# Patient Record
Sex: MALE | Race: WHITE | NOT HISPANIC OR LATINO | Employment: FULL TIME | URBAN - METROPOLITAN AREA
[De-identification: names, ages, dates, MRNs, and addresses within clinical notes are randomized per-mention and may not be internally consistent; named-entity substitution may affect disease eponyms.]

---

## 2020-09-08 ENCOUNTER — OFFICE VISIT (OUTPATIENT)
Dept: URGENT CARE | Facility: CLINIC | Age: 45
End: 2020-09-08
Payer: COMMERCIAL

## 2020-09-08 VITALS
HEART RATE: 70 BPM | RESPIRATION RATE: 18 BRPM | TEMPERATURE: 98.3 F | DIASTOLIC BLOOD PRESSURE: 88 MMHG | OXYGEN SATURATION: 98 % | SYSTOLIC BLOOD PRESSURE: 130 MMHG

## 2020-09-08 DIAGNOSIS — J01.10 ACUTE FRONTAL SINUSITIS, RECURRENCE NOT SPECIFIED: Primary | ICD-10-CM

## 2020-09-08 DIAGNOSIS — Z11.59 SCREENING FOR VIRAL DISEASE: ICD-10-CM

## 2020-09-08 PROCEDURE — U0003 INFECTIOUS AGENT DETECTION BY NUCLEIC ACID (DNA OR RNA); SEVERE ACUTE RESPIRATORY SYNDROME CORONAVIRUS 2 (SARS-COV-2) (CORONAVIRUS DISEASE [COVID-19]), AMPLIFIED PROBE TECHNIQUE, MAKING USE OF HIGH THROUGHPUT TECHNOLOGIES AS DESCRIBED BY CMS-2020-01-R: HCPCS | Performed by: PHYSICIAN ASSISTANT

## 2020-09-08 PROCEDURE — 99203 OFFICE O/P NEW LOW 30 MIN: CPT | Performed by: FAMILY MEDICINE

## 2020-09-08 RX ORDER — PAROXETINE HYDROCHLORIDE 20 MG/1
20 TABLET, FILM COATED ORAL DAILY
COMMUNITY
Start: 2020-06-24

## 2020-09-08 NOTE — PATIENT INSTRUCTIONS
Take antibiotic as instructed  May also do Mucinex D 12 hour to help with symptom relief  May continue Tylenol and or ibuprofen for headache  Rest   Fluids  COVID testing initiated  Results may take up to 5-10 days to return  If you have a [facility] my chart account, you may access test results through that account  If you do not have the [facility] my chart account, please establish one so you can access your test results  Home quarantine / self isolation strongly encouraged  If you would develop profound weakness, chest pain, shortness of breath proceed to emergency room for further evaluation otherwise recommend follow-up with your primary care provider in the next 5-7 days if you are not improving  [address]    Your healthcare provider and/or public health staff have evaluated you and have determined that you do not need to remain in the hospital at this time  At this time you can be isolated at home where you will be monitored by staff from your local or state health department  You should carefully follow the prevention and isolation steps below until a healthcare provider or local or state health department says that you can return to your normal activities  Stay home except to get medical care    People who are mildly ill with COVID-19 are able to isolate at home during their illness  You should restrict activities outside your home, except for getting medical care  Do not go to work, school, or public areas  Avoid using public transportation, ride-sharing, or taxis  Separate yourself from other people and animals in your home    People: As much as possible, you should stay in a specific room and away from other people in your home  Also, you should use a separate bathroom, if available  Animals: You should restrict contact with pets and other animals while you are sick with COVID-19, just like you would around other people   Although there have not been reports of pets or other animals becoming sick with COVID-19, it is still recommended that people sick with COVID-19 limit contact with animals until more information is known about the virus  When possible, have another member of your household care for your animals while you are sick  If you are sick with COVID-19, avoid contact with your pet, including petting, snuggling, being kissed or licked, and sharing food  If you must care for your pet or be around animals while you are sick, wash your hands before and after you interact with pets and wear a facemask  See COVID-19 and Animals for more information  Call ahead before visiting your doctor    If you have a medical appointment, call the healthcare provider and tell them that you have or may have COVID-19  This will help the healthcare providers office take steps to keep other people from getting infected or exposed  Wear a facemask    You should wear a facemask when you are around other people (e g , sharing a room or vehicle) or pets and before you enter a healthcare providers office  If you are not able to wear a facemask (for example, because it causes trouble breathing), then people who live with you should not stay in the same room with you, or they should wear a facemask if they enter your room  Cover your coughs and sneezes    Cover your mouth and nose with a tissue when you cough or sneeze  Throw used tissues in a lined trash can  Immediately wash your hands with soap and water for at least 20 seconds or, if soap and water are not available, clean your hands with an alcohol-based hand  that contains at least 60% alcohol  Clean your hands often    Wash your hands often with soap and water for at least 20 seconds, especially after blowing your nose, coughing, or sneezing; going to the bathroom; and before eating or preparing food   If soap and water are not readily available, use an alcohol-based hand  with at least 60% alcohol, covering all surfaces of your hands and rubbing them together until they feel dry  Soap and water are the best option if hands are visibly dirty  Avoid touching your eyes, nose, and mouth with unwashed hands  Avoid sharing personal household items    You should not share dishes, drinking glasses, cups, eating utensils, towels, or bedding with other people or pets in your home  After using these items, they should be washed thoroughly with soap and water  Clean all high-touch surfaces everyday    High touch surfaces include counters, tabletops, doorknobs, bathroom fixtures, toilets, phones, keyboards, tablets, and bedside tables  Also, clean any surfaces that may have blood, stool, or body fluids on them  Use a household cleaning spray or wipe, according to the label instructions  Labels contain instructions for safe and effective use of the cleaning product including precautions you should take when applying the product, such as wearing gloves and making sure you have good ventilation during use of the product  Monitor your symptoms    Seek prompt medical attention if your illness is worsening (e g , difficulty breathing)  Before seeking care, call your healthcare provider and tell them that you have, or are being evaluated for, COVID-19  Put on a facemask before you enter the facility  These steps will help the healthcare providers office to keep other people in the office or waiting room from getting infected or exposed  Ask your healthcare provider to call the local or [facility] health department  Persons who are placed under active monitoring or facilitated self-monitoring should follow instructions provided by their local health department or occupational health professionals, as appropriate  If you have a medical emergency and need to call 911, notify the dispatch personnel that you have, or are being evaluated for COVID-19   If possible, put on a facemask before emergency medical services arrive  Discontinuing home isolation    Patients with confirmed COVID-19 should remain under home isolation precautions until the following conditions are met:   - They have had no fever for at least 24 hours (that is one full day of no fever without the use medicine that reduces fevers)  AND  - other symptoms have improved (for example, when their cough or shortness of breath have improved)  AND  - at least 10 days have passed since their symptoms first appeared  Patients with confirmed COVID-19 should also notify close contacts (including their workplace) and ask that they self-quarantine  Currently, close contact is defined as being within 6 feet for 10 minutes or more from the period 48 hours before symptom onset to the time at which the patient went into isolation  Close contacts of patients diagnosed with COVID-19 should be instructed by the patient to self-quarantine for 14 days from the last time of their last contact with the patient       Source: [employer]

## 2020-09-09 LAB — SARS-COV-2 RNA SPEC QL NAA+PROBE: NOT DETECTED

## 2025-01-13 NOTE — PROGRESS NOTES
Patient daughter is calling stating patient is feeling weak and lightheaded and wants to speak with nurse please follow up with patient daughter   [facility]    NAME: Ronni Cheatham is a 54 y o  male  : 1975    MRN: [id number]  DATE: 2020  TIME: 12:56 PM    Assessment and Plan   Acute frontal sinusitis, recurrence not specified [J01 10]  1  Acute frontal sinusitis, recurrence not specified  Novel Coronavirus (COVID-19), PCR [facility] - Office Collection   2  Screening for viral disease  Novel Coronavirus (COVID-19), PCR [facility] - Office Collection       Patient Instructions   Patient Instructions   Take antibiotic as instructed  May also do Mucinex D 12 hour to help with symptom relief  May continue Tylenol and or ibuprofen for headache  Rest   Fluids  COVID testing initiated  Results may take up to 5-10 days to return  If you have a [facility] my chart account, you may access test results through that account  If you do not have the [employer]  [facility] my chart account, please establish one so you can access your test results  Home quarantine / self isolation strongly encouraged  If you would develop profound weakness, chest pain, shortness of breath proceed to emergency room for further evaluation otherwise recommend follow-up with your primary care provider in the next 5-7 days if you are not improving  [address]    Your healthcare provider and/or public health staff have evaluated you and have determined that you do not need to remain in the hospital at this time  At this time you can be isolated at home where you will be monitored by staff from your local or state health department  You should carefully follow the prevention and isolation steps below until a healthcare provider or local or state health department says that you can return to your normal activities  Stay home except to get medical care    People who are mildly ill with COVID-19 are able to isolate at home during their illness  You should restrict activities outside your home, except for getting medical care   Do not go to work, school, or public areas  Avoid using public transportation, ride-sharing, or taxis  Separate yourself from other people and animals in your home    People: As much as possible, you should stay in a specific room and away from other people in your home  Also, you should use a separate bathroom, if available  Animals: You should restrict contact with pets and other animals while you are sick with COVID-19, just like you would around other people  Although there have not been reports of pets or other animals becoming sick with COVID-19, it is still recommended that people sick with COVID-19 limit contact with animals until more information is known about the virus  When possible, have another member of your household care for your animals while you are sick  If you are sick with COVID-19, avoid contact with your pet, including petting, snuggling, being kissed or licked, and sharing food  If you must care for your pet or be around animals while you are sick, wash your hands before and after you interact with pets and wear a facemask  See COVID-19 and Animals for more information  Call ahead before visiting your doctor    If you have a medical appointment, call the healthcare provider and tell them that you have or may have COVID-19  This will help the healthcare providers office take steps to keep other people from getting infected or exposed  Wear a facemask    You should wear a facemask when you are around other people (e g , sharing a room or vehicle) or pets and before you enter a healthcare providers office  If you are not able to wear a facemask (for example, because it causes trouble breathing), then people who live with you should not stay in the same room with you, or they should wear a facemask if they enter your room  Cover your coughs and sneezes    Cover your mouth and nose with a tissue when you cough or sneeze  Throw used tissues in a lined trash can   Immediately wash your hands with soap and water for at least 20 seconds or, if soap and water are not available, clean your hands with an alcohol-based hand  that contains at least 60% alcohol  Clean your hands often    Wash your hands often with soap and water for at least 20 seconds, especially after blowing your nose, coughing, or sneezing; going to the bathroom; and before eating or preparing food  If soap and water are not readily available, use an alcohol-based hand  with at least 60% alcohol, covering all surfaces of your hands and rubbing them together until they feel dry  Soap and water are the best option if hands are visibly dirty  Avoid touching your eyes, nose, and mouth with unwashed hands  Avoid sharing personal household items    You should not share dishes, drinking glasses, cups, eating utensils, towels, or bedding with other people or pets in your home  After using these items, they should be washed thoroughly with soap and water  Clean all high-touch surfaces everyday    High touch surfaces include counters, tabletops, doorknobs, bathroom fixtures, toilets, phones, keyboards, tablets, and bedside tables  Also, clean any surfaces that may have blood, stool, or body fluids on them  Use a household cleaning spray or wipe, according to the label instructions  Labels contain instructions for safe and effective use of the cleaning product including precautions you should take when applying the product, such as wearing gloves and making sure you have good ventilation during use of the product  Monitor your symptoms    Seek prompt medical attention if your illness is worsening (e g , difficulty breathing)  Before seeking care, call your healthcare provider and tell them that you have, or are being evaluated for, COVID-19  Put on a facemask before you enter the facility  These steps will help the healthcare providers office to keep other people in the office or waiting room from getting infected or exposed  Ask your healthcare provider to call the local or [facility] health department  Persons who are placed under active monitoring or facilitated self-monitoring should follow instructions provided by their local health department or occupational health professionals, as appropriate  If you have a medical emergency and need to call 911, notify the dispatch personnel that you have, or are being evaluated for COVID-19  If possible, put on a facemask before emergency medical services arrive  Discontinuing home isolation    Patients with confirmed COVID-19 should remain under home isolation precautions until the following conditions are met:   - They have had no fever for at least 24 hours (that is one full day of no fever without the use medicine that reduces fevers)  AND  - other symptoms have improved (for example, when their cough or shortness of breath have improved)  AND  - at least 10 days have passed since their symptoms first appeared  Patients with confirmed COVID-19 should also notify close contacts (including their workplace) and ask that they self-quarantine  Currently, close contact is defined as being within 6 feet for 10 minutes or more from the period 48 hours before symptom onset to the time at which the patient went into isolation  Close contacts of patients diagnosed with COVID-19 should be instructed by the patient to self-quarantine for 14 days from the last time of their last contact with the patient  Source: [employer]              Chief Complaint   No chief complaint on file  History of Present Illness   Quyen Cantor presents to the clinic c/o  27-year-old male with sinus congestion, pain, headache that started about 4 days ago  Not his typical headache  Has been taking extra-strength Tylenol without any relief  Denies any fever  Fatigue, diaphoresis  No fever or chills  No diarrhea or abdominal pain    Sense of smell seems a little off  No runny nose  No postnasal drip  No sore throat  Occasional cough  No known COVID exposure  Does have a couple kids that are in and out of the home  Working from home  No history of asthma or pneumonia  History deviated nasal septum from broken nose  Nonsmoker  Review of Systems   Review of Systems   Constitutional: Positive for activity change, diaphoresis and fatigue  Negative for appetite change, chills and fever  HENT: Positive for congestion, sinus pressure and sinus pain  Negative for ear discharge, ear pain, postnasal drip, rhinorrhea and sore throat  Congestion off and on but does not feel like normal congestion  Eyes: Negative  Respiratory: Positive for cough  Occasional cough but no chest pain or shortness of breath   Cardiovascular: Negative  Gastrointestinal: Negative  Skin: Negative for rash  Neurological: Positive for headaches  Negative for dizziness  Headache across forehead  Current Medications     Long-Term Medications   Medication Sig Dispense Refill    PARoxetine (PAXIL) 20 mg tablet Take 20 mg by mouth daily         Current Allergies     Allergies as of 09/08/2020    (No Known Allergies)          The following portions of the patient's history were reviewed and updated as appropriate: allergies, current medications, past family history, past medical history, past social history, past surgical history and problem list   Past Medical History:   Diagnosis Date    Diverticulitis      Past Surgical History:   Procedure Laterality Date    COLON SURGERY       History reviewed  No pertinent family history  Objective   /88   Pulse 70   Temp 98 3 °F (36 8 °C)   Resp 18   SpO2 98%   No LMP for male patient  Physical Exam     Physical Exam  Vitals signs and nursing note reviewed  Constitutional:       General: He is not in acute distress  Appearance: Normal appearance  He is well-developed   He is not ill-appearing, toxic-appearing or diaphoretic  HENT:      Head: Normocephalic and atraumatic  Comments: Frontal sinus with TTP bilaterally     Right Ear: Tympanic membrane, ear canal and external ear normal  There is no impacted cerumen  Left Ear: Tympanic membrane, ear canal and external ear normal  There is no impacted cerumen  Nose: Congestion and rhinorrhea present  Mouth/Throat:      Mouth: Mucous membranes are moist       Pharynx: No oropharyngeal exudate or posterior oropharyngeal erythema  Eyes:      General: No scleral icterus  Right eye: No discharge  Left eye: No discharge  Extraocular Movements: Extraocular movements intact  Conjunctiva/sclera: Conjunctivae normal       Pupils: Pupils are equal, round, and reactive to light  Neck:      Musculoskeletal: Normal range of motion and neck supple  No neck rigidity  Cardiovascular:      Rate and Rhythm: Normal rate and regular rhythm  Heart sounds: Normal heart sounds  No murmur  No friction rub  No gallop  Pulmonary:      Effort: Pulmonary effort is normal  No respiratory distress  Breath sounds: Normal breath sounds  No stridor  No wheezing, rhonchi or rales  Lymphadenopathy:      Cervical: No cervical adenopathy  Skin:     General: Skin is warm and dry  Neurological:      Mental Status: He is alert and oriented to person, place, and time     Psychiatric:         Mood and Affect: Mood normal          Behavior: Behavior normal